# Patient Record
Sex: MALE | Race: WHITE
[De-identification: names, ages, dates, MRNs, and addresses within clinical notes are randomized per-mention and may not be internally consistent; named-entity substitution may affect disease eponyms.]

---

## 2022-05-13 ENCOUNTER — HOSPITAL ENCOUNTER (EMERGENCY)
Dept: HOSPITAL 75 - ER | Age: 22
Discharge: HOME | End: 2022-05-13
Payer: MEDICAID

## 2022-05-13 VITALS — HEIGHT: 70 IN | BODY MASS INDEX: 15.75 KG/M2 | WEIGHT: 110.01 LBS

## 2022-05-13 VITALS — DIASTOLIC BLOOD PRESSURE: 86 MMHG | SYSTOLIC BLOOD PRESSURE: 129 MMHG

## 2022-05-13 DIAGNOSIS — T39.092A: Primary | ICD-10-CM

## 2022-05-13 DIAGNOSIS — F32.2: ICD-10-CM

## 2022-05-13 DIAGNOSIS — Z20.822: ICD-10-CM

## 2022-05-13 LAB
ALBUMIN SERPL-MCNC: 4.6 GM/DL (ref 3.2–4.5)
ALBUMIN SERPL-MCNC: 5 GM/DL (ref 3.2–4.5)
ALP SERPL-CCNC: 74 U/L (ref 40–136)
ALP SERPL-CCNC: 80 U/L (ref 40–136)
ALT SERPL-CCNC: 32 U/L (ref 0–55)
ALT SERPL-CCNC: 35 U/L (ref 0–55)
APAP SERPL-MCNC: < 10 UG/ML (ref 10–30)
APTT PPP: YELLOW S
ARTERIAL PATENCY WRIST A: POSITIVE
BACTERIA #/AREA URNS HPF: NEGATIVE /HPF
BARBITURATES UR QL: NEGATIVE
BASE EXCESS STD BLDA CALC-SCNC: -3.3 MMOL/L (ref -2.5–2.5)
BASOPHILS # BLD AUTO: 0.1 10^3/UL (ref 0–0.1)
BASOPHILS NFR BLD AUTO: 1 % (ref 0–10)
BDY SITE: (no result)
BENZODIAZ UR QL SCN: NEGATIVE
BILIRUB SERPL-MCNC: 0.4 MG/DL (ref 0.1–1)
BILIRUB SERPL-MCNC: 0.5 MG/DL (ref 0.1–1)
BILIRUB UR QL STRIP: NEGATIVE
BODY TEMPERATURE: 37
BUN/CREAT SERPL: 18
BUN/CREAT SERPL: 18
CALCIUM SERPL-MCNC: 10.1 MG/DL (ref 8.5–10.1)
CALCIUM SERPL-MCNC: 9.6 MG/DL (ref 8.5–10.1)
CHLORIDE SERPL-SCNC: 104 MMOL/L (ref 98–107)
CHLORIDE SERPL-SCNC: 105 MMOL/L (ref 98–107)
CO2 BLDA CALC-SCNC: 21.6 MMOL/L (ref 21–31)
CO2 SERPL-SCNC: 20 MMOL/L (ref 21–32)
CO2 SERPL-SCNC: 22 MMOL/L (ref 21–32)
COCAINE UR QL: NEGATIVE
CREAT SERPL-MCNC: 1.03 MG/DL (ref 0.6–1.3)
CREAT SERPL-MCNC: 1.1 MG/DL (ref 0.6–1.3)
EOSINOPHIL # BLD AUTO: 0.1 10^3/UL (ref 0–0.3)
EOSINOPHIL NFR BLD AUTO: 1 % (ref 0–10)
FIBRINOGEN PPP-MCNC: (no result) MG/DL
GFR SERPLBLD BASED ON 1.73 SQ M-ARVRAT: 107 ML/MIN
GFR SERPLBLD BASED ON 1.73 SQ M-ARVRAT: 99 ML/MIN
GLUCOSE SERPL-MCNC: 93 MG/DL (ref 70–105)
GLUCOSE SERPL-MCNC: 93 MG/DL (ref 70–105)
GLUCOSE UR STRIP-MCNC: NEGATIVE MG/DL
HCT VFR BLD CALC: 46 % (ref 40–54)
HGB BLD-MCNC: 16 G/DL (ref 13.3–17.7)
INHALED O2 FLOW RATE: (no result) L/MIN
KETONES UR QL STRIP: NEGATIVE
LEUKOCYTE ESTERASE UR QL STRIP: NEGATIVE
LYMPHOCYTES # BLD AUTO: 2.5 10^3/UL (ref 1–4)
LYMPHOCYTES NFR BLD AUTO: 28 % (ref 12–44)
MANUAL DIFFERENTIAL PERFORMED BLD QL: NO
MCH RBC QN AUTO: 30 PG (ref 25–34)
MCHC RBC AUTO-ENTMCNC: 35 G/DL (ref 32–36)
MCV RBC AUTO: 86 FL (ref 80–99)
METHADONE UR QL SCN: NEGATIVE
MONOCYTES # BLD AUTO: 0.7 10^3/UL (ref 0–1)
MONOCYTES NFR BLD AUTO: 8 % (ref 0–12)
NEUTROPHILS # BLD AUTO: 5.7 10^3/UL (ref 1.8–7.8)
NEUTROPHILS NFR BLD AUTO: 62 % (ref 42–75)
NITRITE UR QL STRIP: NEGATIVE
OPIATES UR QL SCN: NEGATIVE
OXYCODONE UR QL: NEGATIVE
PCO2 BLDA: 33 MMHG (ref 35–45)
PH BLDA: 7.41 [PH] (ref 7.37–7.43)
PH UR STRIP: 7 [PH] (ref 5–9)
PLATELET # BLD: 274 10^3/UL (ref 130–400)
PMV BLD AUTO: 10.6 FL (ref 9–12.2)
PO2 BLDA: 102 MMHG (ref 79–93)
POTASSIUM SERPL-SCNC: 3.6 MMOL/L (ref 3.6–5)
POTASSIUM SERPL-SCNC: 3.8 MMOL/L (ref 3.6–5)
PROPOXYPH UR QL: NEGATIVE
PROT SERPL-MCNC: 7.3 GM/DL (ref 6.4–8.2)
PROT SERPL-MCNC: 8 GM/DL (ref 6.4–8.2)
PROT UR QL STRIP: NEGATIVE
RBC #/AREA URNS HPF: (no result) /HPF
SALICYLATES SERPL-MCNC: 11.3 MG/DL (ref 5–20)
SALICYLATES SERPL-MCNC: 12.1 MG/DL (ref 5–20)
SAO2 % BLDA FROM PO2: 98 % (ref 94–100)
SODIUM SERPL-SCNC: 139 MMOL/L (ref 135–145)
SODIUM SERPL-SCNC: 141 MMOL/L (ref 135–145)
SP GR UR STRIP: 1.02 (ref 1.02–1.02)
SQUAMOUS #/AREA URNS HPF: (no result) /HPF
TRICYCLICS UR QL SCN: NEGATIVE
VENTILATION MODE VENT: NO
WBC # BLD AUTO: 9.1 10^3/UL (ref 4.3–11)
WBC #/AREA URNS HPF: (no result) /HPF

## 2022-05-13 PROCEDURE — 93005 ELECTROCARDIOGRAM TRACING: CPT

## 2022-05-13 PROCEDURE — 80306 DRUG TEST PRSMV INSTRMNT: CPT

## 2022-05-13 PROCEDURE — 80320 DRUG SCREEN QUANTALCOHOLS: CPT

## 2022-05-13 PROCEDURE — 36415 COLL VENOUS BLD VENIPUNCTURE: CPT

## 2022-05-13 PROCEDURE — 99285 EMERGENCY DEPT VISIT HI MDM: CPT

## 2022-05-13 PROCEDURE — 85025 COMPLETE CBC W/AUTO DIFF WBC: CPT

## 2022-05-13 PROCEDURE — 82805 BLOOD GASES W/O2 SATURATION: CPT

## 2022-05-13 PROCEDURE — 81000 URINALYSIS NONAUTO W/SCOPE: CPT

## 2022-05-13 PROCEDURE — 80053 COMPREHEN METABOLIC PANEL: CPT

## 2022-05-13 PROCEDURE — 93041 RHYTHM ECG TRACING: CPT

## 2022-05-13 PROCEDURE — 87636 SARSCOV2 & INF A&B AMP PRB: CPT

## 2022-05-13 PROCEDURE — 80329 ANALGESICS NON-OPIOID 1 OR 2: CPT

## 2022-05-13 NOTE — ED PSYCHOSOCIAL
General


Stated Complaint:  OD


Source:  patient, EMS


Exam Limitations:  no limitations


 (DOTTIE DELEON MD)





History of Present Illness


Date Seen by Provider:  May 13, 2022


Time Seen by Provider:  02:35


Initial Comments


Patient is a 21-year-old male who presents to the emergency department today 

with a chief complaint of depression, suicidal ideation with attempt.  He states

that he took 16 325 mg aspirin at about 135 this morning.  He does not know if 

the aspirin were "enteric coated" or not. He states he felt "overwhelmed with 

life".  He does not elaborate on why he specifically overdosed tonight. He 

states he has never attempted suicide in the past.  No prior hospitalizations. 

He states he called the ambulance because his ex girlfriend was worried about 

him. 


 He states he has had depression since he has been about 8 years old.  He 

recently started Prozac a month ago by his primary care physician, Dr. Prince. 

He is on 10 mg daily.  He denies any other ingestions tonight.  He lives at home

with several family members a total of 8 people.  He is not currently working.  

He is not currently in school.  When I asked him if he still felt suicidal he 

said "yes a little bit".  We talked about voluntary admission to a psychiatric 

facility and at this point he seems agreeable.  He denies any recent illnesses 

such as fevers, chills, cough or congestion, no COVID complaints.  He is not 

currently feeling nauseous or having abdominal pain.  He is not dizzy.  He feels

a little "hot".





He has not vomited.





All other review of systems reviewed and negative except as stated.


Timing/Duration:  just prior to arrival (0135)


Severity:  severe


Associated Symptoms:  suicidal ideation


 (DOTTIE DELEON MD)





Allergies and Home Medications


Allergies


Coded Allergies:  


     No Known Drug Allergies (Unverified , 10/27/14)





Patient Home Medication List


Home Medication List Reviewed:  Yes


 (DOTTIE DLEEON MD)


No Active Prescriptions or Reported Meds





Review of Systems


Constitutional:  see HPI


EENTM:  no symptoms reported


Respiratory:  no symptoms reported


Cardiovascular:  no symptoms reported


Gastrointestinal:  no symptoms reported


Genitourinary:  no symptoms reported


Musculoskeletal:  no symptoms reported


Skin:  other (feels "hot")


Psychiatric/Neurological:  Depressed, Emotional Problems (DOTTIE DELEON MD)





All Other Systems Reviewed


Negative Unless Noted:  Yes


 (DOTTIE DELEON MD)





Past Medical-Social-Family Hx


Immunizations Up To Date


PED Vaccines UTD:  Yes


 (DOTTIE DELEON MD)





Seasonal Allergies


Seasonal Allergies:  Yes


 (DOTTIE DELEON MD)





Past Medical History


Reproductive Disorders:  No


 (DOTTIE DELEON MD)





Family Medical History





Diabetes mellitus (YOUNGER BROTHER AND GRANDFATHER)





Physical Exam





Vital Signs - First Documented




















 (HO JAY)


Capillary Refill :  


 (DOTTIE DELEON MD)


Height, Weight, BMI


Height: 5'2"


Weight: 100lbs. oz. 45.382707bd;  BMI


Method:Stated


General Appearance:  WD/WN, no apparent distress


HEENT:  PERRL/EOMI


Neck:  normal inspection


Respiratory:  lungs clear, normal breath sounds, no respiratory distress, no 

accessory muscle use


Cardiovascular:  regular rate, rhythm (80's)


Gastrointestinal:  non tender, soft


Extremities:  normal range of motion, normal inspection, normal capillary refill


Neurologic/Psychiatric:  alert, oriented x 3, depressed affect, other (good eye 

contact; answers questions - but avoids elaborating on details as to why he took

the aspirin)


Appearance/Memory:  appropriate appearance, neat


Behavior/Eye Contact:  cooperative, good eye contact, normal speech


Thoughts/Hallucinations:  normal thought pattern, no apparent hallucination


Skin:  normal color, warm/dry, other (skin feels warm) (DOTTIE DELEON MD)





Progress/Results/Core Measures


Results/Orders


Lab Results





Laboratory Tests








Test


 5/13/22


02:55 5/13/22


03:15 5/13/22


03:45 5/13/22


04:32 Range/Units


 


 


White Blood Count


 9.1 


 


 


 


 4.3-11.0


10^3/uL


 


Red Blood Count


 5.40 


 


 


 


 4.30-5.52


10^6/uL


 


Hemoglobin 16.0     13.3-17.7  g/dL


 


Hematocrit 46     40-54  %


 


Mean Corpuscular Volume 86     80-99  fL


 


Mean Corpuscular Hemoglobin 30     25-34  pg


 


Mean Corpuscular Hemoglobin


Concent 35 


 


 


 


 32-36  g/dL





 


Red Cell Distribution Width 11.9     10.0-14.5  %


 


Platelet Count


 274 


 


 


 


 130-400


10^3/uL


 


Mean Platelet Volume 10.6     9.0-12.2  fL


 


Immature Granulocyte % (Auto) 0      %


 


Neutrophils (%) (Auto) 62     42-75  %


 


Lymphocytes (%) (Auto) 28     12-44  %


 


Monocytes (%) (Auto) 8     0-12  %


 


Eosinophils (%) (Auto) 1     0-10  %


 


Basophils (%) (Auto) 1     0-10  %


 


Neutrophils # (Auto)


 5.7 


 


 


 


 1.8-7.8


10^3/uL


 


Lymphocytes # (Auto)


 2.5 


 


 


 


 1.0-4.0


10^3/uL


 


Monocytes # (Auto)


 0.7 


 


 


 


 0.0-1.0


10^3/uL


 


Eosinophils # (Auto)


 0.1 


 


 


 


 0.0-0.3


10^3/uL


 


Basophils # (Auto)


 0.1 


 


 


 


 0.0-0.1


10^3/uL


 


Immature Granulocyte # (Auto)


 0.0 


 


 


 


 0.0-0.1


10^3/uL


 


Sodium Level 141    139  135-145  MMOL/L


 


Potassium Level 3.8    3.6  3.6-5.0  MMOL/L


 


Chloride Level 104    105    MMOL/L


 


Carbon Dioxide Level 22    20 L 21-32  MMOL/L


 


Anion Gap 15 H   14  5-14  MMOL/L


 


Blood Urea Nitrogen 20 H   19 H 7-18  MG/DL


 


Creatinine


 1.10 


 


 


 1.03 


 0.60-1.30


MG/DL


 


Estimat Glomerular Filtration


Rate 99 


 


 


 107 


  





 


BUN/Creatinine Ratio 18    18   


 


Glucose Level 93    93    MG/DL


 


Calcium Level 10.1    9.6  8.5-10.1  MG/DL


 


Corrected Calcium       8.5-10.1  MG/DL


 


Total Bilirubin 0.5    0.4  0.1-1.0  MG/DL


 


Aspartate Amino Transf


(AST/SGOT) 26 


 


 


 25 


 5-34  U/L





 


Alanine Aminotransferase


(ALT/SGPT) 35 


 


 


 32 


 0-55  U/L





 


Alkaline Phosphatase 80    74    U/L


 


Total Protein 8.0    7.3  6.4-8.2  GM/DL


 


Albumin 5.0 H   4.6 H 3.2-4.5  GM/DL


 


Salicylates Level 12.1    11.3  5.0-20.0  MG/DL


 


Acetaminophen Level < 10 L    10-30  UG/ML


 


Serum Alcohol < 10     <10  MG/DL


 


Influenza Type A (RT-PCR) Not Detected     Not Detecte  


 


Influenza Type B (RT-PCR) Not Detected     Not Detecte  


 


SARS-CoV-2 RNA (RT-PCR) Not Detected     Not Detecte  


 


Blood Gas Puncture Site  RIGHT RADIAL     


 


Blood Gas Patient Temperature  37     


 


Arterial Blood pH  7.41    7.37-7.43  


 


Arterial Blood Partial


Pressure CO2 


 33 L


 


 


 35-45  MMHG





 


Arterial Blood Partial


Pressure O2 


 102 H


 


 


 79-93  MMHG





 


Arterial Blood HCO3  21 L   23-27  MMOL/L


 


Arterial Blood Total CO2


 


 21.6 


 


 


 21.0-31.0


MMOL/L


 


Arterial Blood Oxygen


Saturation 


 98 


 


 


   %





 


Arterial Blood Base Excess


 


 -3.3 L


 


 


 -2.5-2.5


MMOL/L


 


Alex Test  POSITIVE     


 


Blood Gas Ventilator Setting  NO     


 


Blood Gas Inspired Oxygen  RA     


 


Urine Color   YELLOW    


 


Urine Clarity   SL CLOUDY    


 


Urine pH   7.0   5-9  


 


Urine Specific Gravity   1.025 H  1.016-1.022  


 


Urine Protein   NEGATIVE   NEGATIVE  


 


Urine Glucose (UA)   NEGATIVE   NEGATIVE  


 


Urine Ketones   NEGATIVE   NEGATIVE  


 


Urine Nitrite   NEGATIVE   NEGATIVE  


 


Urine Bilirubin   NEGATIVE   NEGATIVE  


 


Urine Urobilinogen   0.2   < = 1.0  MG/DL


 


Urine Leukocyte Esterase   NEGATIVE   NEGATIVE  


 


Urine RBC (Auto)   NEGATIVE   NEGATIVE  


 


Urine RBC   NONE    /HPF


 


Urine WBC   NONE    /HPF


 


Urine Squamous Epithelial


Cells 


 


 0-2 


 


  /HPF





 


Urine Crystals   NONE    /LPF


 


Urine Bacteria   NEGATIVE    /HPF


 


Urine Casts   NONE    /LPF


 


Urine Mucus   NEGATIVE    /LPF


 


Urine Culture Indicated   NO    


 


Urine Opiates Screen   NEGATIVE   NEGATIVE  


 


Urine Oxycodone Screen   NEGATIVE   NEGATIVE  


 


Urine Methadone Screen   NEGATIVE   NEGATIVE  


 


Urine Propoxyphene Screen   NEGATIVE   NEGATIVE  


 


Urine Barbiturates Screen   NEGATIVE   NEGATIVE  


 


Ur Tricyclic Antidepressants


Screen 


 


 NEGATIVE 


 


 NEGATIVE  





 


Urine Phencyclidine Screen   NEGATIVE   NEGATIVE  


 


Urine Amphetamines Screen   NEGATIVE   NEGATIVE  


 


Urine Methamphetamines Screen   NEGATIVE   NEGATIVE  


 


Urine Benzodiazepines Screen   NEGATIVE   NEGATIVE  


 


Urine Cocaine Screen   NEGATIVE   NEGATIVE  


 


Urine Cannabinoids Screen   POSITIVE H  NEGATIVE  





 (HO JAY)


My Orders





Orders - HO JAY


General/Regular (5/13/22 Breakfast)


 (HO JAY)


Medications Given in ED





Current Medications








 Medications  Dose


 Ordered  Sig/Kyrie


 Route  Start Time


 Stop Time Status Last Admin


Dose Admin


 


 Charcoal  50 gm  ONCE  ONCE


 PO  5/13/22 02:45


 5/13/22 02:46 DC 5/13/22 02:54


50 GM





 (HO JAY)


Vital Signs/I&O











 5/13/22 5/13/22





 02:30 02:30


 


Temp 37.0 


 


Pulse 79 


 


Resp 16 


 


B/P (MAP) 131/98 (109) 


 


Pulse Ox 99 


 


O2 Delivery Room Air Room Air





 (HO JAY)





Progress


Progress Note :  


   Time:  03:50


Progress Note


rechecked patient - still without symptoms. Not tachycardic or tachypneic.  No 

abdominal pain, n/v.  Is not light headed or SOB.  Initial salicylate was 12.  

Will recheck a cmp and level at 430.  I suspect at this time a non toxic 

ingestion due to no developing symptoms of a reported 5gm ingestion - at 0130.  

He should have well developed symptoms by this point.  Will continue to monitor.


 (DOTTIE DELEON MD)


Progress Note #1:  


Progress Note


Assumed care of the patient at shift change.  He is resting comfortably asleep 

and his salicylates are going down.  He is medically cleared.  We are going to 

contact [facility] shortly for a screening.


Progress Note #2:  


   Time:  09:53


Progress Note


Mental health  from [facility] has visited with the patient and 

working with the patient they have come up with a safety plan for the patient to

discharge safely home.  He has family involved.  We agree with this plan.


 (HO JAY)


Initial ECG Impression Date:  May 13, 2022


Initial ECG Impression Time:  02:52


Initial ECG Rate:  55


Initial ECG Rhythm:  S.Partha


Initial ECG Intervals:  Normal


Initial ECG Impression:  Normal


Initial ECG Comparisson:  No Previous ECG Available


 (DOTTIE DELEON MD)





Departure


Impression





   Primary Impression:  


   Salicylate overdose


   Qualified Codes:  T39.092A - Poisoning by salicylates, intentional self-harm,

   initial encounter


   Additional Impressions:  


   Suicide attempt


   Depression


   Qualified Codes:  F32.2 - Major depressive disorder, single episode, severe 

   without psychotic features


Disposition:  01 HOME, SELF-CARE


Condition:  Stable





Departure-Patient Inst.


Decision time for Depature:  09:53


 (HO JAY)


Referrals:  


UNKNOWN (PCP/Family)


Primary Care Physician


Patient Instructions:  OUTPT MENTAL HEALTH SERVICES, Depression, Adult (DC)





Add. Discharge Instructions:  


Please refer to the safety plan.


Return to the ER, or call [facility] at [phone] if you 

are having worsening depression or suicidal thoughts


Scripts


No Active Prescriptions or Reported Meds


Work/School Note:  Work Release Form   Date Seen in the Emergency Department:  

May 12, 2022


   Return to Work:  May 16, 2022


   Restrictions:  No Restrictions





Copy


Copies To 1:   ROSI PRINCE MD, KATHRYN M MD         May 13, 2022 02:50


HO JAY                 May 13, 2022 06:10